# Patient Record
Sex: FEMALE | Race: BLACK OR AFRICAN AMERICAN
[De-identification: names, ages, dates, MRNs, and addresses within clinical notes are randomized per-mention and may not be internally consistent; named-entity substitution may affect disease eponyms.]

---

## 2017-01-17 ENCOUNTER — HOSPITAL ENCOUNTER (EMERGENCY)
Dept: HOSPITAL 35 - ER | Age: 47
LOS: 1 days | Discharge: HOME | End: 2017-01-18
Payer: COMMERCIAL

## 2017-01-17 VITALS — WEIGHT: 205.01 LBS | HEIGHT: 66 IN | BODY MASS INDEX: 32.95 KG/M2
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
## 2017-01-17 DIAGNOSIS — J45.909: ICD-10-CM
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
## 2017-01-17 DIAGNOSIS — S39.012A: Primary | ICD-10-CM

## 2017-01-17 DIAGNOSIS — I10: ICD-10-CM

## 2017-01-18 VITALS — DIASTOLIC BLOOD PRESSURE: 100 MMHG | SYSTOLIC BLOOD PRESSURE: 165 MMHG

## 2020-02-26 ENCOUNTER — HOSPITAL ENCOUNTER (EMERGENCY)
Dept: HOSPITAL 35 - ER | Age: 50
Discharge: HOME | End: 2020-02-26
Payer: COMMERCIAL

## 2020-02-26 VITALS — DIASTOLIC BLOOD PRESSURE: 101 MMHG | SYSTOLIC BLOOD PRESSURE: 157 MMHG

## 2020-02-26 VITALS — BODY MASS INDEX: 32.95 KG/M2 | HEIGHT: 66 IN | WEIGHT: 205.01 LBS

## 2020-02-26 DIAGNOSIS — J18.9: Primary | ICD-10-CM

## 2020-02-26 DIAGNOSIS — J45.909: ICD-10-CM

## 2020-02-26 DIAGNOSIS — I10: ICD-10-CM

## 2020-02-26 LAB
ALBUMIN SERPL-MCNC: 3.7 G/DL (ref 3.4–5)
ALT SERPL-CCNC: 25 U/L (ref 30–65)
ANION GAP SERPL CALC-SCNC: 7 MMOL/L (ref 7–16)
AST SERPL-CCNC: 26 U/L (ref 15–37)
BASOPHILS NFR BLD AUTO: 0.7 % (ref 0–2)
BILIRUB SERPL-MCNC: 0.5 MG/DL
BUN SERPL-MCNC: 15 MG/DL (ref 7–18)
CALCIUM SERPL-MCNC: 8.4 MG/DL (ref 8.5–10.1)
CHLORIDE SERPL-SCNC: 98 MMOL/L (ref 98–107)
CO2 SERPL-SCNC: 31 MMOL/L (ref 21–32)
CREAT SERPL-MCNC: 0.9 MG/DL (ref 0.6–1)
EOSINOPHIL NFR BLD: 2.2 % (ref 0–3)
ERYTHROCYTE [DISTWIDTH] IN BLOOD BY AUTOMATED COUNT: 16.5 % (ref 10.5–14.5)
GLUCOSE SERPL-MCNC: 105 MG/DL (ref 74–106)
GRANULOCYTES NFR BLD MANUAL: 68.9 % (ref 36–66)
HCT VFR BLD CALC: 39.8 % (ref 37–47)
HGB BLD-MCNC: 12.8 GM/DL (ref 12–15)
LYMPHOCYTES NFR BLD AUTO: 21.7 % (ref 24–44)
MCH RBC QN AUTO: 27 PG (ref 26–34)
MCHC RBC AUTO-ENTMCNC: 32.2 G/DL (ref 28–37)
MCV RBC: 83.8 FL (ref 80–100)
MONOCYTES NFR BLD: 6.5 % (ref 1–8)
NEUTROPHILS # BLD: 5.9 THOU/UL (ref 1.4–8.2)
PLATELET # BLD: 254 THOU/UL (ref 150–400)
POTASSIUM SERPL-SCNC: 3.1 MMOL/L (ref 3.5–5.1)
PROT SERPL-MCNC: 7.5 G/DL (ref 6.4–8.2)
RBC # BLD AUTO: 4.75 MIL/UL (ref 4.2–5)
SODIUM SERPL-SCNC: 136 MMOL/L (ref 136–145)
TROPONIN I SERPL-MCNC: <0.06 NG/ML (ref ?–0.06)
WBC # BLD AUTO: 8.5 THOU/UL (ref 4–11)

## 2020-02-28 NOTE — EKG
[facility]
Shahzad Richardson
[place]   [address]                     ELECTROCARDIOGRAM REPORT      
_______________________________________________________________________________
 
Name:       SARAH REDDING              Room #:                     DEP [facility]#:      [id number]                       Account #:      [id number]  
Admission:  20    Attend Phys:                          
Discharge:  20    Date of Birth:  [birth year]  
                                                          Report #: [id number]
                                                                    [id number]
_______________________________________________________________________________
THIS REPORT FOR:  
 
cc:  Zbigniew Franz MD, Karmel MD Lundgren,Meir BROOKS MD [facility]                                        ~
THIS REPORT FOR:   //name//                          
 
                         [facility] ED
                                       
Test Date:    2020               Test Time:    20:18:01
Pat Name:     SARAH REDDING             Department:   
Patient ID:   [id number]            Room:          
Gender:       F                        Technician:   CATHY
:          1970               Requested By: Harleen Galo
Order Number: [id number] MD:   Meir Cadet
                                 Measurements
Intervals                              Axis          
Rate:         81                       P:            34
MN:           177                      QRS:          -43
QRSD:         99                       T:            85
QT:           409                                    
QTc:          475                                    
                           Interpretive Statements
Sinus rhythm
Atrial premature complex
Left atrial enlargement
Left ventricular hypertrophy
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
Compared to ECG 2016 01:20:40
Atrial premature complex(es) now present
 
Electronically Signed On 2020 9:22:02 CST by Meir Cadet
[email or web address]
 
 
 
 
 
 
 
 
 
 
 
  <ELECTRONICALLY SIGNED>
   By: Meir Cadet MD, [facility]   
  20     0922
D: 20                           _____________________________________
T: 20                           Meir Cadet MD, [facility]     /EPI

## 2020-11-20 LAB
ALBUMIN SERPL-MCNC: 3.8 G/DL (ref 3.4–5)
ANION GAP SERPL CALC-SCNC: 10 MMOL/L (ref 7–16)
BILIRUB UR-MCNC: NEGATIVE MG/DL
BUN SERPL-MCNC: 12 MG/DL (ref 7–18)
CALCIUM SERPL-MCNC: 8.8 MG/DL (ref 8.5–10.1)
CHLORIDE SERPL-SCNC: 100 MMOL/L (ref 98–107)
CO2 SERPL-SCNC: 30 MMOL/L (ref 21–32)
COLOR UR: YELLOW
CREAT SERPL-MCNC: 0.9 MG/DL (ref 0.6–1)
ERYTHROCYTE [DISTWIDTH] IN BLOOD BY AUTOMATED COUNT: 15.8 % (ref 10.5–14.5)
GLUCOSE SERPL-MCNC: 90 MG/DL (ref 74–106)
HCT VFR BLD CALC: 38.6 % (ref 37–47)
HGB BLD-MCNC: 12.5 GM/DL (ref 12–15)
INR PPP: 1
KETONES UR STRIP-MCNC: NEGATIVE MG/DL
MCH RBC QN AUTO: 27.3 PG (ref 26–34)
MCHC RBC AUTO-ENTMCNC: 32.3 G/DL (ref 28–37)
MCV RBC: 84.4 FL (ref 80–100)
PLATELET # BLD: 245 THOU/UL (ref 150–400)
POTASSIUM SERPL-SCNC: 3.8 MMOL/L (ref 3.5–5.1)
PROTHROMBIN TIME: 9.8 SECONDS (ref 9.3–11.4)
RBC # BLD AUTO: 4.57 MIL/UL (ref 4.2–5)
RBC # UR STRIP: NEGATIVE /UL
SODIUM SERPL-SCNC: 140 MMOL/L (ref 136–145)
SP GR UR STRIP: 1.02 (ref 1–1.03)
URINE CLARITY: CLEAR
URINE GLUCOSE-RANDOM*: NEGATIVE
URINE LEUKOCYTES-REFLEX: NEGATIVE
URINE NITRITE-REFLEX: NEGATIVE
URINE PROTEIN (DIPSTICK): NEGATIVE
UROBILINOGEN UR STRIP-ACNC: 0.2 E.U./DL (ref 0.2–1)
WBC # BLD AUTO: 6.7 THOU/UL (ref 4–11)

## 2020-11-20 NOTE — EKG
[facility]
Shahzad Richardson
[place]   [address]                     ELECTROCARDIOGRAM REPORT      
_______________________________________________________________________________
 
Name:       SARAH REDDING              Room #:                     PRE [facility] 
M..#:      [id number]                       Account #:      [id number]  
Admission:              Attend Phys:    Boris Sorensen MD  
Discharge:              Date of Birth:  [birth year]  
                                                          Report #: [id number]
                                                                    [id number]
_______________________________________________________________________________
THIS REPORT FOR:  
 
cc:  Zbigniew Franz MD, Karmel MD Santiago, Patrick MD [facility]                                         ~
THIS REPORT FOR:   //name//                          
 
                          [facility]
                                       
Test Date:    2020               Test Time:    11:53:19
Pat Name:     SARAH REDDING             Department:   
Patient ID:   [id number]            Room:          
Gender:       F                        Technician:   THADDEUS
:          1970               Requested By: Boris Sorensen
Order Number: [id number] MD:   Raymond Valle
                                 Measurements
Intervals                              Axis          
Rate:         80                       P:            62
CA:           184                      QRS:          -35
QRSD:         102                      T:            57
QT:           415                                    
QTc:          479                                    
                           Interpretive Statements
Sinus rhythm
Left ventricular hypertrophy
Compared to ECG 2020 20:18:01
Atrial premature complex(es) no longer present
Atrial abnormality no longer present
Myocardial infarct finding no longer present
Electronically Signed On 2020 11:57:43 CST by Raymond Valle
[email or web address]
 
 
 
 
 
 
 
 
 
 
 
 
 
  <ELECTRONICALLY SIGNED>
   By: Raymond Valle MD, FACC    
  20     1157
D: 20 1153                           _____________________________________
T: 20 1153                           Raymond Valle MD, FAC      /EPI

## 2020-11-24 ENCOUNTER — HOSPITAL ENCOUNTER (OUTPATIENT)
Dept: HOSPITAL 35 - LAB | Age: 50
End: 2020-11-24
Attending: ORTHOPAEDIC SURGERY
Payer: COMMERCIAL

## 2020-11-24 DIAGNOSIS — Z01.812: Primary | ICD-10-CM

## 2020-11-24 DIAGNOSIS — Z20.828: ICD-10-CM

## 2020-11-30 ENCOUNTER — HOSPITAL ENCOUNTER (OUTPATIENT)
Dept: HOSPITAL 35 - OR | Age: 50
Setting detail: OBSERVATION
LOS: 1 days | Discharge: HOME | End: 2020-12-01
Attending: ORTHOPAEDIC SURGERY | Admitting: ORTHOPAEDIC SURGERY
Payer: COMMERCIAL

## 2020-11-30 VITALS — DIASTOLIC BLOOD PRESSURE: 95 MMHG | SYSTOLIC BLOOD PRESSURE: 179 MMHG

## 2020-11-30 VITALS — HEIGHT: 65 IN | BODY MASS INDEX: 35.82 KG/M2 | WEIGHT: 215 LBS

## 2020-11-30 VITALS — SYSTOLIC BLOOD PRESSURE: 154 MMHG | DIASTOLIC BLOOD PRESSURE: 104 MMHG

## 2020-11-30 VITALS — SYSTOLIC BLOOD PRESSURE: 157 MMHG | DIASTOLIC BLOOD PRESSURE: 101 MMHG

## 2020-11-30 VITALS — DIASTOLIC BLOOD PRESSURE: 98 MMHG | SYSTOLIC BLOOD PRESSURE: 160 MMHG

## 2020-11-30 DIAGNOSIS — M17.12: Primary | ICD-10-CM

## 2020-11-30 DIAGNOSIS — Z79.899: ICD-10-CM

## 2020-11-30 DIAGNOSIS — J18.9: ICD-10-CM

## 2020-11-30 PROCEDURE — 62900: CPT

## 2020-11-30 PROCEDURE — 50101: CPT

## 2020-11-30 PROCEDURE — 57180 TREAT VAGINAL BLEEDING: CPT

## 2020-11-30 PROCEDURE — 50010 RENAL EXPLORATION: CPT

## 2020-11-30 PROCEDURE — 57110 VAGNC COMPL RMVL VAG WALL: CPT

## 2020-11-30 PROCEDURE — 64043: CPT

## 2020-11-30 PROCEDURE — 70005: CPT

## 2020-11-30 PROCEDURE — 51225: CPT

## 2020-11-30 PROCEDURE — 57127: CPT

## 2020-11-30 PROCEDURE — 51320: CPT

## 2020-11-30 PROCEDURE — 53365: CPT

## 2020-11-30 PROCEDURE — 53000 INCISION OF URETHRA: CPT

## 2020-11-30 PROCEDURE — 56527: CPT

## 2020-11-30 PROCEDURE — 57103: CPT

## 2020-11-30 PROCEDURE — 51130: CPT

## 2020-11-30 PROCEDURE — 56528: CPT

## 2020-11-30 PROCEDURE — 62110: CPT

## 2020-11-30 PROCEDURE — 50415: CPT

## 2020-11-30 PROCEDURE — 65060: CPT

## 2020-11-30 PROCEDURE — 50954: CPT

## 2020-11-30 PROCEDURE — 53078: CPT

## 2020-11-30 NOTE — O
[facility]
Shahzad Richardson
[place]   [address]                     OPERATIVE REPORT              
_______________________________________________________________________________
 
Name:       SARAH REDDING              Room #:         434-P       [facility] 
M.R.#:      [id number]                       Account #:      [id number]  
Admission:  11/30/20    Attend Phys:    Boris Sorensen MD  
Discharge:              Date of Birth:  [birth year]  
                                                          Report #: [id number]
                                                                    [id number]   
_______________________________________________________________________________
THIS REPORT FOR:  
 
cc:  Zbigniew Franz MD,Zbigniew Sorensen,Boris JOHNSON MD                                          ~
CC: Zbigniew Sorensen
 
DATE OF SERVICE:  11/30/2020
 
 
PREOPERATIVE DIAGNOSIS:  Left knee osteoarthritis.
 
POSTOPERATIVE DIAGNOSIS:  Left knee osteoarthritis.
 
PROCEDURE:  Left total knee arthroplasty using Navio robotic assistant.
 
SURGEON:  Boris Sorensen MD.
 
ASSISTANT:  Silvia Gordillo PA-C.
 
INDICATIONS FOR ASSISTANT:  Throughout the case, extensive retraction and
manipulation of the knee was required.  This was afforded to me by my assistant.
 
ANESTHESIA:  LMA with an adductor canal block.
 
IMPLANTS:  [employer] size 5 Journey II BCS Oxinium femur, size 3 tibia,
size 32 patella and a size 9 polyethylene.
 
TOURNIQUET TIME:  58 minutes.
 
ESTIMATED BLOOD LOSS:  25 mL.
 
COMPLICATIONS:  None.
 
SPECIMENS:  None.
 
CONDITION UPON LEAVING THE OPERATING ROOM:  Stable.
 
INDICATIONS FOR PROCEDURE:  The patient is a 50-year-old female with left knee
osteoarthritis.  She had failed conservative measures for this and after
discussion with her, she elected for left total knee arthroplasty.
 
DESCRIPTION OF PROCEDURE:  Risks, benefits, alternatives, complications were
discussed in detail with the patient including but not limited to risk of
anesthesia, risk of damage to nerves, arteries, blood vessels, risk for
 
 
 
[facility]
[address]
[place]   [address]                     OPERATIVE REPORT              
_______________________________________________________________________________
 
Name:       SARAH REDDING              Room #:         434-P       [facility] 
KAMINI.#:      [id number]                       Account #:      [id number]  
Admission:  11/30/20    Attend Phys:    Boris Sorensen MD  
Discharge:              Date of Birth:  [birth year]  
                                                          Report #: [id number]
                                                                    [id number]   
_______________________________________________________________________________
infection, bleeding, risk for continued knee pain, need for reoperation. 
Informed consent was obtained from the patient.  Left knee was appropriately
marked in the preoperative holding area.  IV Ancef was given for preoperative
antibiotics.  Adductor canal block was placed by Anesthesia.  She was brought to
the operating room and placed in supine position on operating room table.  LMA
anesthesia was induced without complication.  Tourniquet was placed on the left
thigh.  Left lower extremity was prepped and draped in normal sterile fashion. 
Timeout was performed properly identifying the patient and procedure as well as
the instrumentation.  All in the operating room were in agreement.  Left lower
extremity was exsanguinated, tourniquet was inflated.  Tourniquet time was 58
minutes.  Standard midline approach to the knee was made with 10 blade through
the skin.  Dissection was taken down sharply to the fascia and deep flaps were
developed medially and laterally.  Fresh 10 blade was used to make a medial
parapatellar arthrotomy and the knee was inspected.  There was noted to be
severe medial compartment with moderate lateral and patellofemoral arthritis. 
ACL and PCL were removed sharply.  Reference pins were placed in the femur and
the tibia.  The knee was digitally mapped using the [employer] robotic system. 
Intraoperative plan was made and we sized a size 5 femur with a size 3 tibia and
a size 10 spacer.  After acceptance of the intraoperative plan, distal femoral
cut was made with a Navio bur.  Distal femoral cutting block was pinned in place
and chamfer cuts were made.  Attention was then turned to the tibia.  Remainder
of the menisci removed with Bovie cautery.  Tibial resection guide was pinned in
place using the Navio for placement and tibial resection was made.  Flexion and
extension gaps were then checked and found to have good balance both medially
and laterally in flexion and extension.  Tibia was sized and found to be a size
3.  Size 3 tibial trial was placed, pinned and punched.  A size 5 femoral trial
was placed and box cut was made.  This was then trialed with a size 9
polyethylene and size 9 polyethylene demonstrated 1 to 1-1/2 millimeter of
laxity medially and laterally throughout range of motion.  A 9 mm was then
resected from the posterior surface of the patella and a size 32 patellar trial
button was placed.  Knee was taken through range of motion, found to have good
patellar tracking.  Trial components were removed.  Bony ends were thoroughly
irrigated with normal saline.  Final size 3 tibia, size 5 Journey II BCS Oxinium
femur and a size 32 patella were cemented in place using standard cementation
techniques.  While the cement cured, a periarticular injection consisting of
morphine, ropivacaine, epinephrine and Toradol was placed around the knee joint
capsule.  After the cement cured, the tourniquet was deflated.  Hemostasis was
obtained with Bovie cautery.  A final size 9 polyethylene was placed.  A gram of
vancomycin was placed deep in the joint.  The fascia was closed with 0 Vicryl,
skin was closed with 2-0 Vicryl, 3-0 Monocryl.  Dermabond and a ARLETTE dressing
was applied.  The patient tolerated this procedure well and went to recovery
room under care of anesthesia postoperatively.
 
 
                         
   By:                               
                   
D: 12/01/20 0711                           _____________________________________
T: 12/01/20 0741                           Boris Sorensen MD            /nt

## 2020-12-01 VITALS — SYSTOLIC BLOOD PRESSURE: 120 MMHG | DIASTOLIC BLOOD PRESSURE: 72 MMHG

## 2020-12-01 VITALS — DIASTOLIC BLOOD PRESSURE: 72 MMHG | SYSTOLIC BLOOD PRESSURE: 120 MMHG

## 2020-12-01 VITALS — SYSTOLIC BLOOD PRESSURE: 152 MMHG | DIASTOLIC BLOOD PRESSURE: 82 MMHG

## 2020-12-01 LAB
ERYTHROCYTE [DISTWIDTH] IN BLOOD BY AUTOMATED COUNT: 15.8 % (ref 10.5–14.5)
HCT VFR BLD CALC: 34.5 % (ref 37–47)
HGB BLD-MCNC: 11.3 GM/DL (ref 12–15)
MCH RBC QN AUTO: 27.6 PG (ref 26–34)
MCHC RBC AUTO-ENTMCNC: 32.7 G/DL (ref 28–37)
MCV RBC: 84.4 FL (ref 80–100)
PLATELET # BLD: 240 THOU/UL (ref 150–400)
RBC # BLD AUTO: 4.09 MIL/UL (ref 4.2–5)
WBC # BLD AUTO: 8.8 THOU/UL (ref 4–11)